# Patient Record
Sex: MALE | Race: BLACK OR AFRICAN AMERICAN | Employment: UNEMPLOYED | URBAN - METROPOLITAN AREA
[De-identification: names, ages, dates, MRNs, and addresses within clinical notes are randomized per-mention and may not be internally consistent; named-entity substitution may affect disease eponyms.]

---
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
## 2023-04-21 ENCOUNTER — ANCILLARY PROCEDURE (OUTPATIENT)
Dept: GENERAL RADIOLOGY | Facility: CLINIC | Age: 18
End: 2023-04-21
Attending: PHYSICIAN ASSISTANT
Payer: COMMERCIAL

## 2023-04-21 ENCOUNTER — OFFICE VISIT (OUTPATIENT)
Dept: URGENT CARE | Facility: URGENT CARE | Age: 18
End: 2023-04-21
Payer: COMMERCIAL

## 2023-04-21 VITALS
TEMPERATURE: 97.5 F | HEART RATE: 61 BPM | DIASTOLIC BLOOD PRESSURE: 69 MMHG | SYSTOLIC BLOOD PRESSURE: 136 MMHG | WEIGHT: 155 LBS | OXYGEN SATURATION: 100 % | RESPIRATION RATE: 16 BRPM

## 2023-04-21 DIAGNOSIS — M25.531 RIGHT WRIST PAIN: Primary | ICD-10-CM

## 2023-04-21 DIAGNOSIS — M25.531 RIGHT WRIST PAIN: ICD-10-CM

## 2023-04-21 PROCEDURE — 73110 X-RAY EXAM OF WRIST: CPT | Mod: TC | Performed by: RADIOLOGY

## 2023-04-21 PROCEDURE — 99203 OFFICE O/P NEW LOW 30 MIN: CPT | Performed by: PHYSICIAN ASSISTANT

## 2023-04-21 RX ORDER — NAPROXEN 500 MG/1
500 TABLET ORAL 2 TIMES DAILY WITH MEALS
Qty: 20 TABLET | Refills: 0 | Status: SHIPPED | OUTPATIENT
Start: 2023-04-21

## 2023-04-21 NOTE — PROGRESS NOTES
Chief Complaint   Patient presents with     Urgent Care     Musculoskeletal Problem     Per patient states he has been having right wrist discomfort states he usually wears a brace when he has pain, but this time is not helping. Patient has been using a wrist brace       Xray- I see no obvious fracture.    Results for orders placed or performed in visit on 04/21/23   XR Wrist Right G/E 3 Views     Status: None (Preliminary result)    Narrative    WRIST RIGHT THREE OR MORE VIEWS   DATE/TIME: 4/21/2023 3:27 PM    INDICATION: Right wrist pain.  COMPARISON: None available.       Impression    IMPRESSION: Anatomic alignment. No acute displaced fracture. No  significant joint space narrowing. No significant soft tissue  swelling.         ASSESSMENT:      ICD-10-CM    1. Right wrist pain  M25.531 XR Wrist Right G/E 3 Views     naproxen (NAPROSYN) 500 MG tablet     Orthopedic  Referral     Wrist/Arm Supplies Order Wrist Brace; Right; non-thumb spica              PLAN: Right wrist pain.  Unclear etiology.  Continue wrist splint over the next 5 days.  Prescription Naprosyn.  Referral to Ortho.  Ice twice daily.  Advised about symptoms which might herald more serious problems.            Mimi Barraza PA-C      SUBJECTIVE:   Yusuf Singh is an 17 year old male who presents with right wrist pain for 4 days.  Has had intermittent right wrist pain for the past 3 years which is random.  At maximum occurs about 3 times a year.  This year it has occurred 2 times already.  Normally resolved by wearing a wrist splint.  This time it has not helped.  He denies any neck pain.  No injury.  Right wrist numbness/tingling with certain wrist movements.  No family history of gout or rheumatoid arthritis.  No fever.  States he is ambidextrous but writes right-handed.    No Known Allergies    History reviewed. No pertinent past medical history.    No current outpatient medications on file prior to visit.  No current  facility-administered medications on file prior to visit.      Social History     Tobacco Use     Smoking status: Never     Smokeless tobacco: Never   Vaping Use     Vaping status: Never Used       ROS:  Gen: no fevers  Musculoskel: + as above  Skin: as above    OBJECTIVE:  /69   Pulse 61   Temp 97.5  F (36.4  C) (Tympanic)   Resp 16   Wt 70.3 kg (155 lb)   SpO2 100%    General:   awake, alert, and cooperative.  NAD.   Head: Normocephalic, atraumatic.  Eyes: Conjunctiva clear,   MS: Right wrist is without erythema or swelling.  Tender dorsal radial right wrist.  Good capillary refill.  Good radial pulse.  Full range of motion of the wrist but with discomfort in all planes.  Negative Tinel's at the wrist.  Mildly positive Tinel's at the elbow for some tingling in the pinky finger.  Neuro: Alert and oriented - normal speech.      Mimi Barraza PA-C

## 2023-05-02 NOTE — PROGRESS NOTES
"SUBJECTIVE:   Yusuf Singh is a 17 year old male who is seen as an Urgent Care  referral for evaluation of right wrist pain that has been present approximately 3 years. On/off.   Worse the past 2 weeks, constant pain, pain with any direction of motion.  Had an episode of sharp pain volarly with twisting, with ? Numbness/tingling into fingers (5th?)   No known injury.    Present symptoms:  with certain wrist movements.    Treatments tried to this point: brace and naproxen recently helped for a short time.      Orthopedic PMH:     Review of Systems:  Constitutional:  NEGATIVE for fever, chills, change in weight  Integumentary/Skin:  NEGATIVE for worrisome rashes, moles or lesions  Eyes:  NEGATIVE for vision changes or irritation  ENT/Mouth:  NEGATIVE for ear, mouth and throat problems  Resp:  NEGATIVE for significant cough or SOB  Breast:  NEGATIVE for masses, tenderness or discharge  CV:  NEGATIVE for chest pain, palpitations or peripheral edema  GI:  NEGATIVE for nausea, abdominal pain, heartburn, or change in bowel habits  :  Negative   Musculoskeletal:  See HPI above  Neuro:  NEGATIVE for weakness, dizziness or paresthesias  Endocrine:  NEGATIVE for temperature intolerance, skin/hair changes  Heme/allergy/immune:  NEGATIVE for bleeding problems  Psychiatric:  NEGATIVE for changes in mood or affect    Past Medical History: No past medical history on file.  Past Surgical History: No past surgical history on file.  Family History: No family history on file.  Social History:   Social History     Tobacco Use     Smoking status: Never     Smokeless tobacco: Never   Vaping Use     Vaping status: Never Used   Substance Use Topics     Alcohol use: Not on file     OBJECTIVE:  Physical Exam:  BP (!) 145/77 (BP Location: Right arm, Patient Position: Sitting, Cuff Size: Adult Regular)   Pulse 51   Ht 1.702 m (5' 7\")   Wt 70.3 kg (155 lb)   SpO2 100%   BMI 24.28 kg/m    General Appearance: healthy, alert and no distress "   Skin: no suspicious lesions or rashes  Neuro: Normal strength and tone, mentation intact and speech normal  Vascular: good pulses, and cappillary refill   Lymph: no lymphadenopathy   Psych:  mentation appears normal and affect normal/bright  Resp: no increased work of breathing     Right Wrist Exam:  Inspection: a small dorsal prominence only seen with full volar flexion, similar to left     Effusion: none   ROM: full. Some pain with ulnar deviation and radial deviation     Dorsiflexion: full    Volar flexion: full    Pronation: 75, painful    Supination: full, painful   Tender: fairly diffuse: dorsum of mid wrist in capitate region, but also around the base of the thumb, in carpal tunnel area.    Strength: good    Normal sensation fingers  Neg Tinels at carpal tunnel  Neg Phalens  No thenar atrophy or fasciculations    X-rays:  Obtained 4/21/23 of the right WRIST: 3-views, reviewed in the office with the patient by myself today and show no abnormalities.     ASSESSMENT:   Right wrist pain, uncertain origin.  No other joints hurting. No evidence of inflammation on exam.   No exam evidence of carpal tunnel syndrome.     PLAN:   MRI right wrist, to see if any underlying issue.  Could consider seeing Rhuem if  MRI negative  Would not do oral steroids empirically at this point.  Bracing as needed     Reports decreased hearing in right ear. Suggest ENT eval.    Return to clinic: Follow up in the office / virtual visit/ MyChart for the results.      MARK Charlton MD  Dept. Orthopedic Surgery  [facility]

## 2023-05-04 ENCOUNTER — OFFICE VISIT (OUTPATIENT)
Dept: ORTHOPEDICS | Facility: CLINIC | Age: 18
End: 2023-05-04
Attending: PHYSICIAN ASSISTANT
Payer: COMMERCIAL

## 2023-05-04 VITALS
WEIGHT: 155 LBS | SYSTOLIC BLOOD PRESSURE: 145 MMHG | BODY MASS INDEX: 24.33 KG/M2 | DIASTOLIC BLOOD PRESSURE: 77 MMHG | OXYGEN SATURATION: 100 % | HEIGHT: 67 IN | HEART RATE: 51 BPM

## 2023-05-04 DIAGNOSIS — H93.90 EAR PROBLEM, UNSPECIFIED LATERALITY: Primary | ICD-10-CM

## 2023-05-04 DIAGNOSIS — M25.531 RIGHT WRIST PAIN: ICD-10-CM

## 2023-05-04 PROCEDURE — 99203 OFFICE O/P NEW LOW 30 MIN: CPT | Performed by: ORTHOPAEDIC SURGERY

## 2023-05-04 ASSESSMENT — PAIN SCALES - GENERAL: PAINLEVEL: MODERATE PAIN (4)

## 2023-05-04 NOTE — LETTER
5/4/2023         RE: Yusuf Singh  [address]  [place] [address]        Dear Colleague,    Thank you for referring your patient, Yusuf Singh, to the [facility]. Please see a copy of my visit note below.    SUBJECTIVE:   Yusuf Singh is a 17 year old male who is seen as an Urgent Care  referral for evaluation of right wrist pain that has been present approximately 3 years. On/off.   Worse the past 2 weeks, constant pain, pain with any direction of motion.  Had an episode of sharp pain volarly with twisting, with ? Numbness/tingling into fingers (5th?)   No known injury.    Present symptoms:  with certain wrist movements.    Treatments tried to this point: brace and naproxen recently helped for a short time.      Orthopedic PMH:     Review of Systems:  Constitutional:  NEGATIVE for fever, chills, change in weight  Integumentary/Skin:  NEGATIVE for worrisome rashes, moles or lesions  Eyes:  NEGATIVE for vision changes or irritation  ENT/Mouth:  NEGATIVE for ear, mouth and throat problems  Resp:  NEGATIVE for significant cough or SOB  Breast:  NEGATIVE for masses, tenderness or discharge  CV:  NEGATIVE for chest pain, palpitations or peripheral edema  GI:  NEGATIVE for nausea, abdominal pain, heartburn, or change in bowel habits  :  Negative   Musculoskeletal:  See HPI above  Neuro:  NEGATIVE for weakness, dizziness or paresthesias  Endocrine:  NEGATIVE for temperature intolerance, skin/hair changes  Heme/allergy/immune:  NEGATIVE for bleeding problems  Psychiatric:  NEGATIVE for changes in mood or affect    Past Medical History: No past medical history on file.  Past Surgical History: No past surgical history on file.  Family History: No family history on file.  Social History:   Social History     Tobacco Use     Smoking status: Never     Smokeless tobacco: Never   Vaping Use     Vaping status: Never Used   Substance Use Topics     Alcohol use: Not on file  "    OBJECTIVE:  Physical Exam:  BP (!) 145/77 (BP Location: Right arm, Patient Position: Sitting, Cuff Size: Adult Regular)   Pulse 51   Ht 1.702 m (5' 7\")   Wt 70.3 kg (155 lb)   SpO2 100%   BMI 24.28 kg/m    General Appearance: healthy, alert and no distress   Skin: no suspicious lesions or rashes  Neuro: Normal strength and tone, mentation intact and speech normal  Vascular: good pulses, and cappillary refill   Lymph: no lymphadenopathy   Psych:  mentation appears normal and affect normal/bright  Resp: no increased work of breathing     Right Wrist Exam:  Inspection: a small dorsal prominence only seen with full volar flexion, similar to left     Effusion: none   ROM: full. Some pain with ulnar deviation and radial deviation     Dorsiflexion: full    Volar flexion: full    Pronation: 75, painful    Supination: full, painful   Tender: fairly diffuse: dorsum of mid wrist in capitate region, but also around the base of the thumb, in carpal tunnel area.    Strength: good    Normal sensation fingers  Neg Tinels at carpal tunnel  Neg Phalens  No thenar atrophy or fasciculations    X-rays:  Obtained 4/21/23 of the right WRIST: 3-views, reviewed in the office with the patient by myself today and show no abnormalities.     ASSESSMENT:   Right wrist pain, uncertain origin.  No other joints hurting. No evidence of inflammation on exam.   No exam evidence of carpal tunnel syndrome.     PLAN:   MRI right wrist, to see if any underlying issue.  Could consider seeing Rhuem if  MRI negative  Would not do oral steroids empirically at this point.  Bracing as needed     Reports decreased hearing in right ear. Suggest ENT eval.    Return to clinic: Follow up in the office / virtual visit/ MyChart for the results.      MARK Charlton MD  Dept. Orthopedic Surgery  [facility]           Again, thank you for allowing me to participate in the care of your patient.        Sincerely,        Tee Charlton, " MD

## 2023-06-27 ENCOUNTER — OFFICE VISIT (OUTPATIENT)
Dept: URGENT CARE | Facility: URGENT CARE | Age: 18
End: 2023-06-27
Payer: COMMERCIAL

## 2023-06-27 VITALS
WEIGHT: 148 LBS | OXYGEN SATURATION: 100 % | TEMPERATURE: 98.5 F | DIASTOLIC BLOOD PRESSURE: 76 MMHG | HEART RATE: 58 BPM | RESPIRATION RATE: 16 BRPM | SYSTOLIC BLOOD PRESSURE: 128 MMHG | BODY MASS INDEX: 23.18 KG/M2

## 2023-06-27 DIAGNOSIS — H61.21 IMPACTED CERUMEN OF RIGHT EAR: Primary | ICD-10-CM

## 2023-06-27 DIAGNOSIS — B97.89 VIRAL RESPIRATORY ILLNESS: ICD-10-CM

## 2023-06-27 DIAGNOSIS — J98.8 VIRAL RESPIRATORY ILLNESS: ICD-10-CM

## 2023-06-27 PROCEDURE — 99213 OFFICE O/P EST LOW 20 MIN: CPT | Mod: 25 | Performed by: PHYSICIAN ASSISTANT

## 2023-06-27 PROCEDURE — 69209 REMOVE IMPACTED EAR WAX UNI: CPT | Mod: RT | Performed by: PHYSICIAN ASSISTANT

## 2023-06-27 NOTE — PROGRESS NOTES
Chief Complaint   Patient presents with     Ear Problem     Reports difficulty hearing out of the Right ear, has pain and feels clogged. Onset: 6/23/23             ASSESSMENT:     ICD-10-CM    1. Impacted cerumen of right ear  H61.21 [place] REMOVAL IMPACTED CERUMEN IRRIGATION/LVG UNILAT      2. Viral respiratory illness  J98.8     B97.89               PLAN: Impacted right ear cerumen.  Can hear after loud lavage.  No evidence of infection, no ear pain.  Symptomatic treatment with over-the-counter medications for cough and congestion.  I have discussed clinical findings with patient.  Side effects of medications discussed.  Symptomatic care is discussed.  I have discussed the possibility of  worsening symptoms and indication to RTC or go to the ER if they occur.  All questions are answered, patient indicates understanding of these issues and is in agreement with plan.   Patient care instructions are discussed/given at the end of visit.   Lots of rest and fluids.      Mimi Barraza PA-C      SUBJECTIVE:  17-year-old male presents for 3 days of nasal congestion, cough and right ear plugged sensation.  No ear pain.  States cannot hear out of right ear and this is what bothers him the most.  No fever.      No Known Allergies    No past medical history on file.    naproxen (NAPROSYN) 500 MG tablet, Take 1 tablet (500 mg) by mouth 2 times daily (with meals)    No current facility-administered medications on file prior to visit.      Social History     Tobacco Use     Smoking status: Never     Smokeless tobacco: Never   Substance Use Topics     Alcohol use: Not Currently       ROS:  CONSTITUTIONAL: Negative for fatigue or fever.  EYES: Negative for eye problems.  ENT: As above.  RESP: Negative for shortness of breath   CV: Negative for chest pains.  GI: Negative for vomiting.  MUSCULOSKELETAL:  Negative for significant muscle or joint pains.  NEUROLOGIC: Negative for headaches.  SKIN: Negative for rash.  PSYCH: Normal  mentation for age.    OBJECTIVE:  /76 (BP Location: Right arm, Patient Position: Sitting, Cuff Size: Adult Regular)   Pulse 58   Temp 98.5  F (36.9  C) (Tympanic)   Resp 16   Wt 67.1 kg (148 lb)   SpO2 100%   BMI 23.18 kg/m    GENERAL APPEARANCE: Healthy, alert and no distress.  EYES:Conjunctiva/sclera clear.  EARS: Right canal impacted with cerumen.  After ear wash right TM and right canal appear without any erythema.  States he can now hear out of the right ear.  Left TM is translucent.  THROAT: No erythema w/o tonsillar enlargement . No exudates.  NECK: Supple, nontender, no lymphadenopathy.  RESP: Lungs clear to auscultation - no rales, rhonchi or wheezes  CV: Regular rate and rhythm, normal S1 S2, no murmur noted.  NEURO: Awake, alert    SKIN: No rashes        Mimi Barraza PA-C